# Patient Record
Sex: MALE | URBAN - METROPOLITAN AREA
[De-identification: names, ages, dates, MRNs, and addresses within clinical notes are randomized per-mention and may not be internally consistent; named-entity substitution may affect disease eponyms.]

---

## 2024-08-12 ENCOUNTER — TELEPHONE ENCOUNTER (OUTPATIENT)
Dept: URBAN - METROPOLITAN AREA CLINIC 35 | Facility: CLINIC | Age: 74
End: 2024-08-12

## 2024-08-13 ENCOUNTER — DASHBOARD ENCOUNTERS (OUTPATIENT)
Age: 74
End: 2024-08-13

## 2024-08-13 ENCOUNTER — OFFICE VISIT (OUTPATIENT)
Dept: URBAN - METROPOLITAN AREA TELEHEALTH 2 | Facility: TELEHEALTH | Age: 74
End: 2024-08-13
Payer: MEDICARE

## 2024-08-13 ENCOUNTER — TELEPHONE ENCOUNTER (OUTPATIENT)
Dept: URBAN - METROPOLITAN AREA CLINIC 36 | Facility: CLINIC | Age: 74
End: 2024-08-13

## 2024-08-13 VITALS — HEIGHT: 73 IN | BODY MASS INDEX: 23.46 KG/M2 | WEIGHT: 177 LBS

## 2024-08-13 DIAGNOSIS — C20 RECTAL CARCINOMA: ICD-10-CM

## 2024-08-13 PROBLEM — 88111009: Status: ACTIVE | Noted: 2024-08-13

## 2024-08-13 PROBLEM — 363351006: Status: ACTIVE | Noted: 2024-08-13

## 2024-08-13 PROCEDURE — 99214 OFFICE O/P EST MOD 30 MIN: CPT | Performed by: INTERNAL MEDICINE

## 2024-08-13 RX ORDER — APIXABAN 5 MG/1
AS DIRECTED TABLET, FILM COATED ORAL
Status: ACTIVE | COMMUNITY

## 2024-08-13 RX ORDER — VERICIGUAT 2.5 MG/1
1 TABLET WITH FOOD TABLET, FILM COATED ORAL ONCE A DAY
Status: ACTIVE | COMMUNITY

## 2024-08-13 RX ORDER — PANCRELIPASE 36000; 180000; 114000 [USP'U]/1; [USP'U]/1; [USP'U]/1
AS DIRECTED CAPSULE, DELAYED RELEASE PELLETS ORAL
Status: ACTIVE | COMMUNITY

## 2024-08-13 RX ORDER — PANTOPRAZOLE SODIUM 40 MG/1
1 TABLET TABLET, DELAYED RELEASE ORAL ONCE A DAY
Status: ACTIVE | COMMUNITY

## 2024-08-13 RX ORDER — ZINC GLUCONATE 50 MG
1 TABLET TABLET ORAL ONCE A DAY
Status: ACTIVE | COMMUNITY

## 2024-08-13 RX ORDER — ATORVASTATIN CALCIUM 20 MG/1
1 TABLET TABLET, FILM COATED ORAL ONCE A DAY
Status: ACTIVE | COMMUNITY

## 2024-08-13 NOTE — HPI-HOSPITAL FOLLOWUP
73 year old male patient present today for a recent hospital follow-up. Patient was originally admitted in the hospital for low blood pressure, but patient does have a history of congenital heart failure. He also reported while there he was experiencing symptoms of difficulty breathing and decrease appetite. Patient mentioned while in the hospital his appetite has decrease significantly and has not been able to consume as many liquids or solid foods. He admits episodes of weight loss, he mentioned in the hospital that he use to weigh 279lb to 169lb. This weight loss was over a 6-month time period. While in the hospital patient experienced symptoms of diarrhea and had a positive FOBT. During that time patient stated he was experiencing 4 loose bowel movements per day. Patient admits having an EGD and colonoscopy in the hospital. He reports having a full work-up while there.   Since being release from the hospital, patient admits improvement with his symptoms. Currently, reports 1-2 bowel movement per day, with some strain. His stools are normal without the presence of blood, mucus, and melena. He denies any episodes of rectal pain or pruritus ani. Patient admits improvement with both his weight and appetite. He eats 1 meal a day. Patient states that he has gained 22 pounds since leaving the hospital.  Critical access hospital Labs 07.28.2024 revealed Chloride: 112H, Carbon Dioxide: 17L, Blood Urea Nitrogen: 31H, Creatinine: 1.34H, U:C: 23H, eGFR: 53L, MCV: 77.5L, RDW: 52.2H, all other labs were normal.   Critical access hospital CT Abdomen/Pelvis wo IV Contrast (07.23.2024): Limited study due to lack of intravenous contrast. Cystic lesion along the anterolateral aspect left kidney smaller size comparison to prior studies. There is now dependent debris along the posterior wall versus wall thickening. Non-obstructing right renal calculus. Probable right renal cyst.   Critical access hospital XR Abdomen (07.24.2024): Non-specific and non-obstructive small bowel pattern.

## 2024-08-20 ENCOUNTER — LAB OUTSIDE AN ENCOUNTER (OUTPATIENT)
Dept: URBAN - METROPOLITAN AREA TELEHEALTH 2 | Facility: TELEHEALTH | Age: 74
End: 2024-08-20

## 2024-09-06 ENCOUNTER — TELEPHONE ENCOUNTER (OUTPATIENT)
Dept: URBAN - METROPOLITAN AREA CLINIC 35 | Facility: CLINIC | Age: 74
End: 2024-09-06

## 2024-09-09 ENCOUNTER — OFFICE VISIT (OUTPATIENT)
Dept: URBAN - METROPOLITAN AREA CLINIC 78 | Facility: CLINIC | Age: 74
End: 2024-09-09

## 2024-09-09 ENCOUNTER — OFFICE VISIT (OUTPATIENT)
Dept: URBAN - METROPOLITAN AREA MEDICAL CENTER 27 | Facility: MEDICAL CENTER | Age: 74
End: 2024-09-09
Payer: MEDICARE

## 2024-09-09 DIAGNOSIS — D12.3 ADENOMA OF TRANSVERSE COLON: ICD-10-CM

## 2024-09-09 DIAGNOSIS — Z85.048 H/O MALIGNANT NEOPLASM OF RECTUM: ICD-10-CM

## 2024-09-09 DIAGNOSIS — K63.89 OTHER SPECIFIED DISEASES OF INTESTINE: ICD-10-CM

## 2024-09-09 PROCEDURE — 45385 COLONOSCOPY W/LESION REMOVAL: CPT | Performed by: INTERNAL MEDICINE

## 2024-10-01 ENCOUNTER — OFFICE VISIT (OUTPATIENT)
Dept: URBAN - METROPOLITAN AREA CLINIC 35 | Facility: CLINIC | Age: 74
End: 2024-10-01
Payer: MEDICARE

## 2024-10-01 VITALS
BODY MASS INDEX: 25.84 KG/M2 | DIASTOLIC BLOOD PRESSURE: 70 MMHG | HEIGHT: 73 IN | WEIGHT: 195 LBS | OXYGEN SATURATION: 97 % | SYSTOLIC BLOOD PRESSURE: 112 MMHG | HEART RATE: 82 BPM

## 2024-10-01 DIAGNOSIS — R19.4 CHANGE IN BOWEL HABITS: ICD-10-CM

## 2024-10-01 DIAGNOSIS — C20 RECTAL CARCINOMA: ICD-10-CM

## 2024-10-01 PROCEDURE — 99213 OFFICE O/P EST LOW 20 MIN: CPT | Performed by: INTERNAL MEDICINE

## 2024-10-01 RX ORDER — ATORVASTATIN CALCIUM 20 MG/1
1 TABLET TABLET, FILM COATED ORAL ONCE A DAY
Status: ACTIVE | COMMUNITY

## 2024-10-01 RX ORDER — ZINC GLUCONATE 50 MG
1 TABLET TABLET ORAL ONCE A DAY
Status: ACTIVE | COMMUNITY

## 2024-10-01 RX ORDER — PANCRELIPASE 36000; 180000; 114000 [USP'U]/1; [USP'U]/1; [USP'U]/1
AS DIRECTED CAPSULE, DELAYED RELEASE PELLETS ORAL
Status: ACTIVE | COMMUNITY

## 2024-10-01 RX ORDER — PANTOPRAZOLE SODIUM 40 MG/1
1 TABLET TABLET, DELAYED RELEASE ORAL ONCE A DAY
Status: ACTIVE | COMMUNITY

## 2024-10-01 RX ORDER — APIXABAN 5 MG/1
AS DIRECTED TABLET, FILM COATED ORAL
Status: ACTIVE | COMMUNITY

## 2024-10-01 RX ORDER — VERICIGUAT 2.5 MG/1
1 TABLET WITH FOOD TABLET, FILM COATED ORAL ONCE A DAY
Status: ACTIVE | COMMUNITY

## 2024-10-01 NOTE — HPI-HOSPITAL FOLLOWUP
Patient deneis any hospitaliztions since his last office visit.   OF LAST VISIT (08.13.2024) 73 year old male patient present today for a recent hospital follow-up. Patient was originally admitted in the hospital for low blood pressure, but patient does have a history of congenital heart failure. He also reported while there he was experiencing symptoms of difficulty breathing and decrease appetite. Patient mentioned while in the hospital his appetite has decrease significantly and has not been able to consume as many liquids or solid foods. He admits episodes of weight loss, he mentioned in the hospital that he use to weigh 279lb to 169lb. This weight loss was over a 6-month time period. While in the hospital patient experienced symptoms of diarrhea and had a positive FOBT. During that time patient stated he was experiencing 4 loose bowel movements per day. Patient admits having an EGD and colonoscopy in the hospital. He reports having a full work-up while there.   Since being release from the hospital, patient admits improvement with his symptoms. Currently, reports 1-2 bowel movement per day, with some strain. His stools are normal without the presence of blood, mucus, and melena. He denies any episodes of rectal pain or pruritus ani. Patient admits improvement with both his weight and appetite. He eats 1 meal a day. Patient states that he has gained 22 pounds since leaving the hospital.  [facility], [facility]1983 Labs 07.28.2024 revealed Chloride: 112H, Carbon Dioxide: 17L, Blood Urea Nitrogen: 31H, Creatinine: 1.34H, U:C: 23H, eGFR: 53L, MCV: 77.5L, RDW: 52.2H, all other labs were normal.   [facility], [facility]1983 CT Abdomen/Pelvis wo IV Contrast (07.23.2024): Limited study due to lack of intravenous contrast. Cystic lesion along the anterolateral aspect left kidney smaller size comparison to prior studies. There is now dependent debris along the posterior wall versus wall thickening. Non-obstructing right renal calculus. Probable right renal cyst.   [facility], [facility]1983 XR Abdomen (07.24.2024): Non-specific and non-obstructive small bowel pattern.

## 2024-10-01 NOTE — HPI-COLONOSCOPY FOLLOWUP
Patient presents today for colonoscopy follow up. Patient had colonoscopy completed on 09/09/2024, with results noted below. Patient denies any complications after procedure. Patient currently admits normal bowel habits. Patient denies rectal bleeding, melena, or mucus.